# Patient Record
Sex: FEMALE | Race: ASIAN | NOT HISPANIC OR LATINO | Employment: UNEMPLOYED | URBAN - METROPOLITAN AREA
[De-identification: names, ages, dates, MRNs, and addresses within clinical notes are randomized per-mention and may not be internally consistent; named-entity substitution may affect disease eponyms.]

---

## 2022-09-28 ENCOUNTER — HOSPITAL ENCOUNTER (EMERGENCY)
Facility: HOSPITAL | Age: 22
Discharge: HOME | End: 2022-09-28

## 2022-09-28 VITALS
SYSTOLIC BLOOD PRESSURE: 167 MMHG | WEIGHT: 230 LBS | OXYGEN SATURATION: 99 % | RESPIRATION RATE: 18 BRPM | DIASTOLIC BLOOD PRESSURE: 96 MMHG | TEMPERATURE: 98.1 F | HEART RATE: 100 BPM | HEIGHT: 64 IN | BODY MASS INDEX: 39.27 KG/M2

## 2022-09-28 DIAGNOSIS — R30.0 DYSURIA: ICD-10-CM

## 2022-09-28 DIAGNOSIS — N39.0 URINARY TRACT INFECTION: ICD-10-CM

## 2022-09-28 LAB
ALBUMIN UR-MCNC: 100 MG/DL
APPEARANCE UR: ABNORMAL
BACTERIA #/AREA URNS HPF: ABNORMAL /HPF
BILIRUB UR QL STRIP: NEGATIVE
COLOR UR AUTO: YELLOW
GLUCOSE UR STRIP-MCNC: NEGATIVE MG/DL
HCG UR QL: NEGATIVE
HGB UR QL STRIP: ABNORMAL
KETONES UR STRIP-MCNC: NEGATIVE MG/DL
LEUKOCYTE ESTERASE UR QL STRIP: ABNORMAL
MUCOUS THREADS #/AREA URNS LPF: PRESENT /LPF
NITRATE UR QL: NEGATIVE
PH UR STRIP: 6 [PH] (ref 5–7)
RBC URINE: >182 /HPF
SP GR UR STRIP: 1.02 (ref 1–1.03)
SQUAMOUS EPITHELIAL: 15 /HPF
UROBILINOGEN UR STRIP-MCNC: <2 MG/DL
WBC CLUMPS #/AREA URNS HPF: PRESENT /HPF
WBC URINE: >182 /HPF

## 2022-09-28 PROCEDURE — 81001 URINALYSIS AUTO W/SCOPE: CPT | Performed by: FAMILY MEDICINE

## 2022-09-28 PROCEDURE — 250N000013 HC RX MED GY IP 250 OP 250 PS 637

## 2022-09-28 PROCEDURE — 81025 URINE PREGNANCY TEST: CPT | Performed by: FAMILY MEDICINE

## 2022-09-28 PROCEDURE — 87086 URINE CULTURE/COLONY COUNT: CPT | Performed by: FAMILY MEDICINE

## 2022-09-28 PROCEDURE — 99283 EMERGENCY DEPT VISIT LOW MDM: CPT

## 2022-09-28 RX ORDER — CEPHALEXIN 500 MG/1
500 CAPSULE ORAL 2 TIMES DAILY
Qty: 10 CAPSULE | Refills: 0 | Status: SHIPPED | OUTPATIENT
Start: 2022-09-28 | End: 2022-10-03

## 2022-09-28 RX ORDER — CEPHALEXIN 500 MG/1
500 CAPSULE ORAL ONCE
Status: COMPLETED | OUTPATIENT
Start: 2022-09-28 | End: 2022-09-28

## 2022-09-28 RX ADMIN — CEPHALEXIN 500 MG: 500 CAPSULE ORAL at 20:49

## 2022-09-28 ASSESSMENT — ENCOUNTER SYMPTOMS
FEVER: 0
FLANK PAIN: 0
ABDOMINAL PAIN: 0
CHILLS: 0
DIFFICULTY URINATING: 0
COUGH: 0
FREQUENCY: 1
VOMITING: 0
WEAKNESS: 0
SHORTNESS OF BREATH: 0
BACK PAIN: 0
NAUSEA: 0
HEMATURIA: 1
HEADACHES: 0
DIZZINESS: 0
DYSURIA: 1

## 2022-09-28 NOTE — ED PROVIDER NOTES
EMERGENCY DEPARTMENT ENCOUNTER      NAME: Kary Lawson  AGE: 22 year old female  YOB: 2000  MRN: [id number]  EVALUATION DATE & TIME: No admission date for patient encounter.    PCP: No Ref-Primary, Physician    ED PROVIDER: Mariela Pedersen PA-C      Chief Complaint   Patient presents with     Dysuria         FINAL IMPRESSION:  1. Dysuria    2. Urinary tract infection          MEDICAL DECISION MAKING:    Pertinent Labs & Imaging studies reviewed. (See chart for details)  Kary Lawson is a 22 year old female with no pertinent history who presents to this ED for evaluation of dysuria, frequency with urination and hematuria x2 days.     On my initial evaluation, vital signs normal. On physical exam patient is awake, alert, in no acute distress, not uncomfortable appearing. Heart sounds normal, lungs clear.mild suprapubic tenderness on palpation, no other abdominal tenderness, no CVA tenderness, No masses, No rebound or guarding or distention.normal bowel sounds. Remainder of exam unremarkable.     Differential diagnosis includes UTI, pyelonephritis, pregnancy, STD. Emergency department workup included UA and pregnancy test. Patient declined wanting anything for pain. Patient declined concern for STD/STI and did not want to be tested for this today.     Urine consistent with infection. Will treat with Keflex, given 1 dose of Keflex here as pharmacy likely closed.  Pregnancy negative. Suspect infection is the cause for her symptoms. Will treat with antibiotics. Urine culture sent and pending. Patient is otherwise well appearing without flank pain so low suspicion for pyelonephritis.     Patient has had serial examinations and notes significant improvement.     Patient was discharged in stable condition with treatment plan as below. Instructed to follow up with primary care provider in 3 days and return to the emergency department with any new or worsening of symptoms. Patient expressed understanding,  feels comfortable, and is in agreement with this plan. All questions addressed prior to discharge.    ED COURSE:  6:23 PM  I reviewed the patient's chart. I met with the patient to gather history and to perform my initial exam.    I wore appropriate PPE during this encounter including: facemask & eye protection   7:32 PM I rechecked the patient and updated her on results. We discussed plan for discharge including treatment plan, follow-up and return precautions to emergency department.  Patient voiced understanding and in agreement with this plan.    At the conclusion of the encounter I discussed the results of all of the tests and the disposition. The questions were answered. The patient or family acknowledged understanding and was agreeable with the care plan.     MEDICATIONS GIVEN IN THE EMERGENCY:  Medications   cefTRIAXone (ROCEPHIN) 1 g in lidocaine (PF) (XYLOCAINE) 1 % injection (has no administration in time range)       NEW PRESCRIPTIONS STARTED AT TODAY'S ER VISIT  New Prescriptions    CEPHALEXIN (KEFLEX) 500 MG CAPSULE    Take 1 capsule (500 mg) by mouth 2 times daily for 5 days     =================================================================    HPI:    Patient information was obtained from: patient and     Use of Interpretor: N/A         Kary GRAY Her is a 22 year old female with no pertinent history who presents to this ED for evaluation of dysuria.     Patient reports 2 day history of urinary frequency, dysuria and blood in urine.  Has not been taking anything for the pain.  Has never had a UTI before.  Denies back pain, fevers, chills, chest pain, shortness of breath, headache, dizziness,  nausea, vomiting, abdominal pain, vaginal bleeding, vaginal discharge or pain with sexual intercourse.  Denies chance of pregnancy.  Otherwise is healthy and feeling well.      REVIEW OF SYSTEMS:  Review of Systems   Constitutional: Negative for chills and fever.   Respiratory: Negative for cough and  "shortness of breath.    Cardiovascular: Negative for chest pain.   Gastrointestinal: Negative for abdominal pain, nausea and vomiting.   Genitourinary: Positive for dysuria, frequency and hematuria. Negative for decreased urine volume, difficulty urinating, flank pain, vaginal bleeding, vaginal discharge and vaginal pain.   Musculoskeletal: Negative for back pain.   Neurological: Negative for dizziness, weakness and headaches.   All other systems reviewed and are negative.       PAST MEDICAL HISTORY:  No past medical history on file.    PAST SURGICAL HISTORY:  No past surgical history on file.    CURRENT MEDICATIONS:      Current Facility-Administered Medications:      cefTRIAXone (ROCEPHIN) 1 g in lidocaine (PF) (XYLOCAINE) 1 % injection, 1 g, Intramuscular, Once, Mariela Pedersen PA-C    Current Outpatient Medications:      cephALEXin (KEFLEX) 500 MG capsule, Take 1 capsule (500 mg) by mouth 2 times daily for 5 days, Disp: 10 capsule, Rfl: 0    ALLERGIES:  No Known Allergies    FAMILY HISTORY:  No family history on file.    SOCIAL HISTORY:   Social History     Socioeconomic History     Marital status:        VITALS:  Patient Vitals for the past 24 hrs:   BP Temp Temp src Pulse Resp SpO2 Height Weight   09/28/22 1725 -- -- -- -- -- 99 % -- --   09/28/22 1724 (!) 167/96 98.1  F (36.7  C) Oral 100 18 -- 1.626 m (5' 4\") 104.3 kg (230 lb)       PHYSICAL EXAM    Constitutional: Well developed, Well nourished, NAD  HENT: Normocephalic, Atraumatic, Bilateral external ears normal, Oropharynx normal, mucous membranes moist, Nose normal.   Neck: Normal range of motion, No tenderness, Supple, No stridor.  Eyes: PERRL, EOMI, Conjunctiva normal, No discharge.   Respiratory: Normal breath sounds, No respiratory distress, No wheezing, Speaks full sentences easily. No cough.  Cardiovascular: Normal heart rate, Regular rhythm, No murmurs, No rubs, No gallops. Chest wall nontender.  GI: Soft,mild suprapubic tenderness on " palpation, no other abdominal  Tenderness, no CVA tenderness, No masses, No rebound or guarding.normal bowel sounds  Musculoskeletal: 2+ DP pulses. No edema. No cyanosis, No clubbing. Good range of motion in all major joints. No tenderness to palpation or major deformities noted. No tenderness of the CTLS spine.   Integument: Warm, Dry, No erythema, No rash. No petechiae.  Neurologic: Alert & oriented x 3, Normal motor function, Normal sensory function, No focal deficits noted. Normal gait.  Psychiatric: Affect normal, Judgment normal, Mood normal. Cooperative.    LAB:  All pertinent labs reviewed and interpreted.  Labs Ordered and Resulted from Time of ED Arrival to Time of ED Departure   ROUTINE UA WITH MICROSCOPIC REFLEX TO CULTURE - Abnormal       Result Value    Color Urine Yellow      Appearance Urine Turbid (*)     Glucose Urine Negative      Bilirubin Urine Negative      Ketones Urine Negative      Specific Gravity Urine 1.025      Blood Urine >1.0 mg/dL (*)     pH Urine 6.0      Protein Albumin Urine 100  (*)     Urobilinogen Urine <2.0      Nitrite Urine Negative      Leukocyte Esterase Urine 500 Kin/uL (*)     Bacteria Urine Few (*)     WBC Clumps Urine Present (*)     Mucus Urine Present (*)     RBC Urine >182 (*)     WBC Urine >182 (*)     Squamous Epithelials Urine 15 (*)    HCG QUALITATIVE URINE - Normal    hCG Urine Qualitative Negative     URINE CULTURE       RADIOLOGY:  Reviewed all pertinent imaging. Please see official radiology report.  No orders to display     Diagnosis:  1. Dysuria    2. Urinary tract infection      Mariela Pedersen PA-C  Emergency Medicine  [facility]  9/28/2022       Mariela Pedersen PA-C  09/28/22 1944       Mariela Pedersen PA-C  09/28/22 1954

## 2022-09-28 NOTE — ED NOTES
"ED Triage Provider Note  [facility]  Encounter Date: Sep 28, 2022    History:  Chief Complaint   Patient presents with     Dysuria     Kary GRAY Her is a 22 year old female who presents to the ED with urinary frequency and pain with urination for a couple days. Vaginal pain, no flank pain.    Review of Systems:  No fever or chills    Exam:  BP (!) 167/96   Pulse 100   Temp 98.1  F (36.7  C) (Oral)   Resp 18   Ht 1.626 m (5' 4\")   Wt 104.3 kg (230 lb)   BMI 39.48 kg/m    General: No acute distress. Appears stated age.   Cardio: Regular rate, extremities well perfused  Resp: Normal work of breathing, grossly normal respiratory rate  Neuro: Alert. CN II-XII grossly intact. Grossly intact strength.     Medical Decision Making:  Patient arriving to the ED with problem as above. A medical screening exam was performed. Lab orders initiated from Triage. The patient is appropriate to wait in triage.      Govind Chase MD on 9/28/2022 at 5:23 PM      Lab/Imaging Results:       Interventions:  Medications - No data to display    Diagnosis:  1. Dysuria         Govind Chase MD  09/28/22 1727    "

## 2022-09-28 NOTE — ED TRIAGE NOTES
uti s/s for last 2 days. No n/v or fevers.     Triage Assessment     Row Name 09/28/22 1724       Triage Assessment (Adult)    Airway WDL WDL

## 2022-09-29 LAB — BACTERIA UR CULT: NORMAL

## 2022-09-29 NOTE — DISCHARGE INSTRUCTIONS
Please bring this paperwork with you to your follow-up appointment.    You were seen in the urgent care/emergency department for burning with urination and other urinary symptoms.     We tested your urine today, which did show that you have an infection. This is very common in women. We will treat you with antibiotics. You were given one dose of antibiotics in the ER. Please fill the prescription tonight or tomorrow and take the first dose tomorrow. Please take the antibiotic keflex twice a day for five days. We will culture your urine and call you if we need to change your antibiotic based on the bacteria that grows.     For your symptoms:  Please drink plenty of fluids.     Tylenol/ibuprofen as needed  You may take up to 650 mg of Tylenol (acetaminophen) up to 4 times daily and up to 600 mg of ibuprofen up to 4 times daily as needed for fever, pain.  Please do not take more than the daily maximum recommended dose (tylenol = 4 grams, ibuprofen = 2.4 grams) as it can cause harm to your liver, kidneys, stomach.  It is best to take ibuprofen with food. Please read labels of any over-the-counter medicine you may be taking as it may contain Tylenol (acetaminophen) or Advil (ibuprofen).     Follow up with your primary care provider for recheck in 3 days for ER follow up.     Return to the emergency department if you develop worsening pain, back pain, fevers, chills, or any other new worsening or concerning symptoms. We'd be happy to see you again.    Thank you for allowing us to be part of your care today.    Take care!  -Mariela Pedersen PA-C

## 2023-03-04 ENCOUNTER — E-VISIT (OUTPATIENT)
Dept: FAMILY MEDICINE | Facility: CLINIC | Age: 23
End: 2023-03-04

## 2023-03-04 DIAGNOSIS — R39.9 URINARY SYMPTOM OR SIGN: Primary | ICD-10-CM

## 2023-03-04 PROCEDURE — 99421 OL DIG E/M SVC 5-10 MIN: CPT | Performed by: PHYSICIAN ASSISTANT

## 2023-03-04 NOTE — PATIENT INSTRUCTIONS
Dear Kary Lawson,    We are sorry you are not feeling well. Based on the responses you provided, it is recommended that you be seen in-person in urgent care so we can better evaluate your symptoms.

## 2023-05-21 ENCOUNTER — HEALTH MAINTENANCE LETTER (OUTPATIENT)
Age: 23
End: 2023-05-21

## 2023-06-04 ENCOUNTER — HEALTH MAINTENANCE LETTER (OUTPATIENT)
Age: 23
End: 2023-06-04

## 2024-07-28 ENCOUNTER — HEALTH MAINTENANCE LETTER (OUTPATIENT)
Age: 24
End: 2024-07-28

## 2025-08-10 ENCOUNTER — HEALTH MAINTENANCE LETTER (OUTPATIENT)
Age: 25
End: 2025-08-10